# Patient Record
(demographics unavailable — no encounter records)

---

## 2025-03-05 NOTE — ADDENDUM
[FreeTextEntry1] : Pt seen via telehealth with [employer] students. Reviewed Hx and PE with patient. d/w patient that GI sx may be secondary to overuse of multiple NSAIDs for back pain recommended to patient that he use acetaminophen whenever possible and resort to NSDAIDs (which he has at home) only for severe pain, Recommended routine labs and hemmacult testing.

## 2025-03-05 NOTE — HISTORY OF PRESENT ILLNESS
[FreeTextEntry1] : " I have stomachache, fatigue, headache" [de-identified] : PAULIE YOUNG is a 34 yr M with a PMH of Vertigo, GERD, and significant PSH of Lumbar laminectomy after an accidental fall, presenting for general evaluation in a setting of chronic, persistent fatigue, lower abdominal pain after meals and tension headaches. The patient reports he vomits about 2-3 times a week, with no blood in his vomitus. He also reports a bloating pain that he has had for months that he reports constant throughout the day. He reports he has diarrhea occasionally with no blood. He reports that the symptoms have been bothering him for a few months, but he has been unable to obtain medical care due to insurance limitation. He also reports muscle pain and backpain that is relived with pain medications prescribed to him after the back surgery. Patient also reports that he has noticed some changes in his memory, and he seems to be more forgetful and has some had some numbness in the legs and hands. He also reports pain the right fingernail sustained from an injury a month ago.  He denies any changes in vision, palpitations, chest pain, and SOB.  His poor health has prevented him from going back to work, and he is concerned that he might be having bigger problems that have not been addressed because of his limited access to healthcare and Insurance. In addition to the abdominal pain and headaches, the patient requested that we assess and rebandage a right index finger injury he sustained a month ago when a car door was closed on his hand. The patient has also reported seasonal skin itchiness and dryness that he thinks may be explained by eczema.    PMH - Vertigo, GERD PSH - Lumbar laminectomy with spinal arthrodesis for implants. Two years ago. Sinus surgery at 12 years.  MEDS - Tylenol, Naproxen, Diclofenac, Gabapentin, Baclofen, Oxycodone, Aleve ALLERGIES - Seasonal allergies.  Family history: Diabetes (Dad), Mom has Vertigo, HTN. SOCIAL Hx - Drinks Alc socially.

## 2025-03-05 NOTE — HISTORY OF PRESENT ILLNESS
[FreeTextEntry1] : " I have stomachache, fatigue, headache" [de-identified] : PAULIE YOUNG is a 34 yr M with a PMH of Vertigo, GERD, and significant PSH of Lumbar laminectomy after an accidental fall, presenting for general evaluation in a setting of chronic, persistent fatigue, lower abdominal pain after meals and tension headaches. The patient reports he vomits about 2-3 times a week, with no blood in his vomitus. He also reports a bloating pain that he has had for months that he reports constant throughout the day. He reports he has diarrhea occasionally with no blood. He reports that the symptoms have been bothering him for a few months, but he has been unable to obtain medical care due to insurance limitation. He also reports muscle pain and backpain that is relived with pain medications prescribed to him after the back surgery. Patient also reports that he has noticed some changes in his memory, and he seems to be more forgetful and has some had some numbness in the legs and hands. He also reports pain the right fingernail sustained from an injury a month ago.  He denies any changes in vision, palpitations, chest pain, and SOB.  His poor health has prevented him from going back to work, and he is concerned that he might be having bigger problems that have not been addressed because of his limited access to healthcare and Insurance. In addition to the abdominal pain and headaches, the patient requested that we assess and rebandage a right index finger injury he sustained a month ago when a car door was closed on his hand. The patient has also reported seasonal skin itchiness and dryness that he thinks may be explained by eczema.    PMH - Vertigo, GERD PSH - Lumbar laminectomy with spinal arthrodesis for implants. Two years ago. Sinus surgery at 12 years.  MEDS - Tylenol, Naproxen, Diclofenac, Gabapentin, Baclofen, Oxycodone, Aleve ALLERGIES - Seasonal allergies.  Family history: Diabetes (Dad), Mom has Vertigo, HTN. SOCIAL Hx - Drinks Alc socially.

## 2025-03-05 NOTE — REVIEW OF SYSTEMS
[Diarrhea] : diarrhea [Fever] : no fever [Chills] : no chills [Hot Flashes] : no hot flashes [Night Sweats] : no night sweats [Discharge] : no discharge [Pain] : no pain [Redness] : no redness [Dryness] : no dryness [Vision Problems] : no vision problems [Itching] : no itching [Earache] : no earache [Hearing Loss] : no hearing loss [Nosebleeds] : no nosebleeds [Postnasal Drip] : no postnasal drip [Nasal Discharge] : no nasal discharge [Hoarseness] : no hoarseness [Chest Pain] : no chest pain [Palpitations] : no palpitations [Lower Ext Edema] : no lower extremity edema [Orthopena] : no orthopnea [Paroxysmal Nocturnal Dyspnea] : no paroxysmal nocturnal dyspnea [Shortness Of Breath] : no shortness of breath [Wheezing] : no wheezing [Cough] : no cough [Dyspnea on Exertion] : not dyspnea on exertion [Constipation] : no constipation [Melena] : no melena [Mole Changes] : no mole changes [Nail Changes] : no nail changes [Hair Changes] : no hair changes [Skin Rash] : no skin rash [Fainting] : no fainting [Easy Bleeding] : no easy bleeding [Easy Bruising] : no easy bruising [Swollen Glands] : no swollen glands [FreeTextEntry2] : 10 pound weight gain in the past year